# Patient Record
Sex: MALE | URBAN - METROPOLITAN AREA
[De-identification: names, ages, dates, MRNs, and addresses within clinical notes are randomized per-mention and may not be internally consistent; named-entity substitution may affect disease eponyms.]

---

## 2021-08-09 ENCOUNTER — CLAIMS CREATED FROM THE CLAIM WINDOW (OUTPATIENT)
Dept: URBAN - METROPOLITAN AREA MEDICAL CENTER 19 | Facility: MEDICAL CENTER | Age: 71
End: 2021-08-09
Payer: MEDICARE

## 2021-08-09 DIAGNOSIS — R10.11 RIGHT UPPER QUADRANT PAIN: ICD-10-CM

## 2021-08-09 DIAGNOSIS — B17.8 OTHER SPECIFIED ACUTE VIRAL HEPATITIS: ICD-10-CM

## 2021-08-09 DIAGNOSIS — K76.0 HEPATIC STEATOSIS: ICD-10-CM

## 2021-08-09 DIAGNOSIS — R74.01 ELEVATION OF LEVELS OF LIVER TRANSAMINASE LEVELS: ICD-10-CM

## 2021-08-09 DIAGNOSIS — D72.828 OTHER ELEVATED WHITE BLOOD CELL COUNT: ICD-10-CM

## 2021-08-09 PROCEDURE — 99223 1ST HOSP IP/OBS HIGH 75: CPT | Performed by: INTERNAL MEDICINE

## 2021-08-10 ENCOUNTER — CLAIMS CREATED FROM THE CLAIM WINDOW (OUTPATIENT)
Dept: URBAN - METROPOLITAN AREA MEDICAL CENTER 19 | Facility: MEDICAL CENTER | Age: 71
End: 2021-08-10
Payer: MEDICARE

## 2021-08-10 DIAGNOSIS — K80.51 CALCULUS OF BILE DUCT WITHOUT CHOLANGITIS OR CHOLECYSTITIS WITH OBSTRUCTION: ICD-10-CM

## 2021-08-10 DIAGNOSIS — R74.8 ABNORMAL LEVELS OF OTHER SERUM ENZYMES: ICD-10-CM

## 2021-08-10 DIAGNOSIS — R17 UNSPECIFIED JAUNDICE: ICD-10-CM

## 2021-08-10 DIAGNOSIS — R93.2 ABNORMAL FINDINGS ON DIAGNOSTIC IMAGING OF LIVER AND BILIARY TRACT: ICD-10-CM

## 2021-08-10 PROCEDURE — 74328 X-RAY BILE DUCT ENDOSCOPY: CPT | Performed by: INTERNAL MEDICINE

## 2021-08-10 PROCEDURE — 43262 ENDO CHOLANGIOPANCREATOGRAPH: CPT | Performed by: INTERNAL MEDICINE

## 2021-08-10 PROCEDURE — 43264 ERCP REMOVE DUCT CALCULI: CPT | Performed by: INTERNAL MEDICINE

## 2021-08-11 ENCOUNTER — CLAIMS CREATED FROM THE CLAIM WINDOW (OUTPATIENT)
Dept: URBAN - METROPOLITAN AREA MEDICAL CENTER 19 | Facility: MEDICAL CENTER | Age: 71
End: 2021-08-11
Payer: MEDICARE

## 2021-08-11 DIAGNOSIS — B17.8 OTHER SPECIFIED ACUTE VIRAL HEPATITIS: ICD-10-CM

## 2021-08-11 DIAGNOSIS — K83.8 OTHER SPECIFIED DISEASES OF BILIARY TRACT: ICD-10-CM

## 2021-08-11 DIAGNOSIS — R74.01 ELEVATION OF LEVELS OF LIVER TRANSAMINASE LEVELS: ICD-10-CM

## 2021-08-11 DIAGNOSIS — R10.11 RIGHT UPPER QUADRANT PAIN: ICD-10-CM

## 2021-08-11 PROCEDURE — 99232 SBSQ HOSP IP/OBS MODERATE 35: CPT | Performed by: INTERNAL MEDICINE

## 2021-10-06 ENCOUNTER — WEB ENCOUNTER (OUTPATIENT)
Dept: URBAN - METROPOLITAN AREA CLINIC 107 | Facility: CLINIC | Age: 71
End: 2021-10-06

## 2021-10-06 ENCOUNTER — ERX REFILL RESPONSE (OUTPATIENT)
Dept: URBAN - METROPOLITAN AREA CLINIC 107 | Facility: CLINIC | Age: 71
End: 2021-10-06

## 2021-10-06 ENCOUNTER — TELEPHONE ENCOUNTER (OUTPATIENT)
Dept: URBAN - METROPOLITAN AREA CLINIC 113 | Facility: CLINIC | Age: 71
End: 2021-10-06

## 2021-10-06 ENCOUNTER — OFFICE VISIT (OUTPATIENT)
Dept: URBAN - METROPOLITAN AREA CLINIC 107 | Facility: CLINIC | Age: 71
End: 2021-10-06
Payer: MEDICARE

## 2021-10-06 VITALS
BODY MASS INDEX: 32.93 KG/M2 | HEART RATE: 72 BPM | TEMPERATURE: 98.6 F | HEIGHT: 70 IN | WEIGHT: 230 LBS | DIASTOLIC BLOOD PRESSURE: 88 MMHG | SYSTOLIC BLOOD PRESSURE: 107 MMHG

## 2021-10-06 DIAGNOSIS — K80.40 CHOLEDOCHOLITHIASIS WITH CHOLECYSTITIS: ICD-10-CM

## 2021-10-06 DIAGNOSIS — Z12.11 COLON CANCER SCREENING: ICD-10-CM

## 2021-10-06 PROBLEM — 36483003: Status: ACTIVE | Noted: 2021-10-06

## 2021-10-06 PROBLEM — 305058001: Status: ACTIVE | Noted: 2021-10-06

## 2021-10-06 PROCEDURE — 99213 OFFICE O/P EST LOW 20 MIN: CPT | Performed by: INTERNAL MEDICINE

## 2021-10-06 RX ORDER — LEVOTHYROXINE SODIUM ANHYDROUS 200 UG/5ML
1 TABLET IN THE MORNING ON AN EMPTY STOMACH INJECTION, POWDER, LYOPHILIZED, FOR SOLUTION INTRAVENOUS ONCE A DAY
Status: ACTIVE | COMMUNITY

## 2021-10-06 RX ORDER — SODIUM, POTASSIUM,MAG SULFATES 17.5-3.13G
354ML SOLUTION, RECONSTITUTED, ORAL ORAL
Qty: 354 MILLILITER | Refills: 0 | OUTPATIENT
Start: 2021-10-06 | End: 2021-10-07

## 2021-10-06 RX ORDER — SODIUM, POTASSIUM,MAG SULFATES 17.5-3.13G
354ML SOLUTION, RECONSTITUTED, ORAL ORAL
Qty: 354 MILLILITER | Refills: 0 | OUTPATIENT

## 2021-10-06 RX ORDER — SODIUM PICOSULFATE, MAGNESIUM OXIDE, AND ANHYDROUS CITRIC ACID 10; 3.5; 12 MG/160ML; G/160ML; G/160ML
160 ML LIQUID ORAL
Qty: 320 MILLILITER | Refills: 0 | OUTPATIENT
Start: 2021-10-06 | End: 2021-10-07

## 2021-10-06 RX ORDER — OMEPRAZOLE MAGNESIUM 20.6 MG/1
1 TABLET 30 MINUTES BEFORE MORNING MEAL TABLET, DELAYED RELEASE ORAL ONCE A DAY
Status: ACTIVE | COMMUNITY

## 2021-10-06 RX ORDER — SODIUM PICOSULFATE, MAGNESIUM OXIDE, AND ANHYDROUS CITRIC ACID 10; 3.5; 12 MG/160ML; G/160ML; G/160ML
160 ML LIQUID ORAL
Qty: 320 MILLILITER | Refills: 0 | OUTPATIENT

## 2021-10-06 NOTE — HPI-TODAY'S VISIT:
Mr. Byrd is a 71-year-old male here for hospital follow-up for choledocholithiasis and cholecystitis.  He underwent ERCP with stone removal and cholecystectomy.  His PCP is Dr Virginia Zapata.  He underwent ERCP 8/10/21 which revealed a large diverticulum with the ampulla within it, small bile duct stone was noted measuring 3 mm in size and the bile duct was dilated to 8 mm, stone extraction was performed as well as sphincterotomy without complicatoions.  He then underwent cholecystectomy 8/13 with Dr. Valadez.  He was discharged home a few days later.  Overall he has recovered well.  He has intermittent diarrhea.  He denies any further abdominal pain, nausea, vomiting or weight loss.  Recent labs his PCP revealed normal liver enzymes on 9/15.  He is due for screening colonoscopy this year as his last one was 10-12 years ago.

## 2021-11-04 ENCOUNTER — OFFICE VISIT (OUTPATIENT)
Dept: URBAN - METROPOLITAN AREA SURGERY CENTER 25 | Facility: SURGERY CENTER | Age: 71
End: 2021-11-04
Payer: MEDICARE

## 2021-11-04 ENCOUNTER — CLAIMS CREATED FROM THE CLAIM WINDOW (OUTPATIENT)
Dept: URBAN - METROPOLITAN AREA CLINIC 4 | Facility: CLINIC | Age: 71
End: 2021-11-04
Payer: MEDICARE

## 2021-11-04 DIAGNOSIS — Z12.11 COLON CANCER SCREENING: ICD-10-CM

## 2021-11-04 DIAGNOSIS — D12.4 ADENOMA OF DESCENDING COLON: ICD-10-CM

## 2021-11-04 DIAGNOSIS — D12.4 BENIGN NEOPLASM OF DESCENDING COLON: ICD-10-CM

## 2021-11-04 PROCEDURE — 45385 COLONOSCOPY W/LESION REMOVAL: CPT | Performed by: INTERNAL MEDICINE

## 2021-11-04 PROCEDURE — 88305 TISSUE EXAM BY PATHOLOGIST: CPT | Performed by: PATHOLOGY

## 2021-11-04 PROCEDURE — G8907 PT DOC NO EVENTS ON DISCHARG: HCPCS | Performed by: INTERNAL MEDICINE

## 2021-11-04 RX ORDER — LEVOTHYROXINE SODIUM ANHYDROUS 200 UG/5ML
1 TABLET IN THE MORNING ON AN EMPTY STOMACH INJECTION, POWDER, LYOPHILIZED, FOR SOLUTION INTRAVENOUS ONCE A DAY
Status: ACTIVE | COMMUNITY

## 2021-11-04 RX ORDER — SODIUM PICOSULFATE, MAGNESIUM OXIDE, AND ANHYDROUS CITRIC ACID 10; 3.5; 12 MG/160ML; G/160ML; G/160ML
160 ML LIQUID ORAL
Qty: 320 MILLILITER | Refills: 0 | Status: ACTIVE | COMMUNITY

## 2021-11-04 RX ORDER — OMEPRAZOLE MAGNESIUM 20.6 MG/1
1 TABLET 30 MINUTES BEFORE MORNING MEAL TABLET, DELAYED RELEASE ORAL ONCE A DAY
Status: ACTIVE | COMMUNITY

## 2021-11-30 ENCOUNTER — DASHBOARD ENCOUNTERS (OUTPATIENT)
Age: 71
End: 2021-11-30

## 2021-11-30 ENCOUNTER — OFFICE VISIT (OUTPATIENT)
Dept: URBAN - METROPOLITAN AREA CLINIC 113 | Facility: CLINIC | Age: 71
End: 2021-11-30
Payer: MEDICARE

## 2021-11-30 VITALS
HEART RATE: 70 BPM | SYSTOLIC BLOOD PRESSURE: 137 MMHG | RESPIRATION RATE: 20 BRPM | HEIGHT: 70 IN | WEIGHT: 233 LBS | DIASTOLIC BLOOD PRESSURE: 83 MMHG | TEMPERATURE: 97.5 F | BODY MASS INDEX: 33.36 KG/M2

## 2021-11-30 DIAGNOSIS — D36.9 TUBULAR ADENOMA: ICD-10-CM

## 2021-11-30 PROCEDURE — 99213 OFFICE O/P EST LOW 20 MIN: CPT | Performed by: NURSE PRACTITIONER

## 2021-11-30 RX ORDER — OMEPRAZOLE MAGNESIUM 20.6 MG/1
1 TABLET 30 MINUTES BEFORE MORNING MEAL TABLET, DELAYED RELEASE ORAL ONCE A DAY
Status: ACTIVE | COMMUNITY

## 2021-11-30 RX ORDER — SODIUM PICOSULFATE, MAGNESIUM OXIDE, AND ANHYDROUS CITRIC ACID 10; 3.5; 12 MG/160ML; G/160ML; G/160ML
160 ML LIQUID ORAL
Qty: 320 MILLILITER | Refills: 0 | Status: ON HOLD | COMMUNITY

## 2021-11-30 RX ORDER — ST. JOHN'S WORT 300 MG
AS DIRECTED CAPSULE ORAL
Status: ACTIVE | COMMUNITY

## 2021-11-30 RX ORDER — LEVOTHYROXINE SODIUM ANHYDROUS 200 UG/5ML
1 TABLET IN THE MORNING ON AN EMPTY STOMACH INJECTION, POWDER, LYOPHILIZED, FOR SOLUTION INTRAVENOUS ONCE A DAY
Status: ACTIVE | COMMUNITY

## 2021-11-30 RX ORDER — UBIDECARENONE 30 MG
AS DIRECTED CAPSULE ORAL
Status: ACTIVE | COMMUNITY

## 2021-11-30 NOTE — HPI-OTHER HISTORIES
He underwent ERCP 8/10/21 which revealed a large diverticulum with the ampulla within it, small bile duct stone was noted measuring 3 mm in size and the bile duct was dilated to 8 mm, stone extraction was performed as well as sphincterotomy without complicatoions.  He then underwent cholecystectomy 8/13 with Dr. Valadez.

## 2021-11-30 NOTE — HPI-TODAY'S VISIT:
Mr. Byrd is a 71-year-old male with a history of choledocholithiasis and cholecystitis s/p ERCP with stone removal and cholecystectomy, presenting for follow-up after screening colonoscopy.  He was last seen 10/6/21 for follow-up after hospitalization for choledocholithiasis and cholecystitis s/p ERCP with stone removal and cholecystectomy. Recent labs with his PCP showed normal LFTs. A screening colonoscopy was planned.  Colonoscopy 11/4/21: one 12mm tubular adenoma in the proximal descending colon, three 2-5mm tubular adenomas in the descending and distal descending colon, moderate diverticulosis in the sigmoid colon, and nonbleeding internal hemorrhoids. Repeat colonoscopy recommended in 3 years.  He is asymptomatic from a GI standpoint, denying abdominal pain, nausea, or vomiting. His bowel habits are regular and he denies blood per rectum.